# Patient Record
Sex: FEMALE | Race: AMERICAN INDIAN OR ALASKA NATIVE
[De-identification: names, ages, dates, MRNs, and addresses within clinical notes are randomized per-mention and may not be internally consistent; named-entity substitution may affect disease eponyms.]

---

## 2019-04-16 ENCOUNTER — HOSPITAL ENCOUNTER (INPATIENT)
Dept: HOSPITAL 5 - LD | Age: 24
LOS: 2 days | Discharge: HOME | End: 2019-04-18
Attending: OBSTETRICS & GYNECOLOGY | Admitting: OBSTETRICS & GYNECOLOGY
Payer: COMMERCIAL

## 2019-04-16 DIAGNOSIS — Z3A.40: ICD-10-CM

## 2019-04-16 DIAGNOSIS — O26.893: ICD-10-CM

## 2019-04-16 DIAGNOSIS — Z67.41: ICD-10-CM

## 2019-04-16 DIAGNOSIS — Z82.49: ICD-10-CM

## 2019-04-16 LAB
HCT VFR BLD CALC: 42.2 % (ref 30.3–42.9)
HGB BLD-MCNC: 14 GM/DL (ref 10.1–14.3)
MCHC RBC AUTO-ENTMCNC: 33 % (ref 30–34)
MCV RBC AUTO: 93 FL (ref 79–97)
PLATELET # BLD: 220 K/MM3 (ref 140–440)
RBC # BLD AUTO: 4.57 M/MM3 (ref 3.65–5.03)

## 2019-04-16 PROCEDURE — 86592 SYPHILIS TEST NON-TREP QUAL: CPT

## 2019-04-16 PROCEDURE — 86850 RBC ANTIBODY SCREEN: CPT

## 2019-04-16 PROCEDURE — 36415 COLL VENOUS BLD VENIPUNCTURE: CPT

## 2019-04-16 PROCEDURE — 10H07YZ INSERTION OF OTHER DEVICE INTO PRODUCTS OF CONCEPTION, VIA NATURAL OR ARTIFICIAL OPENING: ICD-10-PCS | Performed by: OBSTETRICS & GYNECOLOGY

## 2019-04-16 PROCEDURE — C1765 ADHESION BARRIER: HCPCS

## 2019-04-16 PROCEDURE — 85014 HEMATOCRIT: CPT

## 2019-04-16 PROCEDURE — 86901 BLOOD TYPING SEROLOGIC RH(D): CPT

## 2019-04-16 PROCEDURE — 85461 HEMOGLOBIN FETAL: CPT

## 2019-04-16 PROCEDURE — C9250 ARTISS FIBRIN SEALANT: HCPCS

## 2019-04-16 PROCEDURE — 85018 HEMOGLOBIN: CPT

## 2019-04-16 PROCEDURE — 85027 COMPLETE CBC AUTOMATED: CPT

## 2019-04-16 PROCEDURE — 86900 BLOOD TYPING SEROLOGIC ABO: CPT

## 2019-04-16 RX ADMIN — OXYTOCIN SCH MLS/HR: 10 INJECTION, SOLUTION INTRAMUSCULAR; INTRAVENOUS at 20:22

## 2019-04-16 RX ADMIN — SODIUM CHLORIDE, SODIUM LACTATE, POTASSIUM CHLORIDE, AND CALCIUM CHLORIDE SCH MLS/HR: .6; .31; .03; .02 INJECTION, SOLUTION INTRAVENOUS at 19:59

## 2019-04-16 RX ADMIN — OXYTOCIN SCH MLS/HR: 10 INJECTION, SOLUTION INTRAMUSCULAR; INTRAVENOUS at 21:40

## 2019-04-16 NOTE — PROGRESS NOTE
Assessment and Plan


 IVF bolus complete, waiting on anesthesia to place epidural. Difficult to trace

FHT continuously. ISE/IUPC placed without difficulty. No moulding or caput. 

cervix now feels swollen and tight around fetal head - unlike before where 

cervix was soft and stretchy. baby is OP.  FHT with early decels, good 

variability and accelerations.  Pelvis feels borderline adequate but difficult 

to fully asses while patient is in pain. Will re-evaluate after epidural and 

relaxation.








- Patient Problems


(1) Active labor at term


Current Visit: Yes   Status: Acute   





(2) Rh negative, maternal


Current Visit: Yes   Status: Acute   


Qualifiers: 


   Trimester: third trimester   Qualified Code(s): O26.893 - Other specified 

pregnancy related conditions, third trimester; Z67.91 - Unspecified blood type, 

Rh negative   





(3) Maternal varicella, non-immune


Current Visit: Yes   Status: Acute   





(4) GBS carrier


Current Visit: Yes   Status: Acute   





(5) 40 weeks gestation of pregnancy


Current Visit: Yes   Status: Acute   





Subjective





- Subjective


Date of service: 19


Principal diagnosis: IUP @ 40+6, laboring


Interval history: 


EDC Calculations by LMP: 04/10/2019








Past Pregnancy History 


 2


Para 0


   Term Births:      0


   Living Children:   0


   Para:      0


   Aborta:      1








Past Medical History:


   Abnormal pap (before age 23) per patient - waiting on records





Past Surgical History:


   Reviewed history from 2017 and no changes required:


      negative





Past Medical History 


Anesthesia Complications: negative


Anemia: negative


Autoimmune Disorder: negative


Bleeding Disorder: negative


Blood Transfusions: negative


Breast Disease: negative


Diabetes: negative


Heart Disease: negative


Hypertension: negative


Hepatitis/Liver Disease: negative


Kidney Disease/UTI: negative


Neurologic/Epilepsy/Migraines: negative


Phlebitis/Varicosities: negative


Psychiatric: negative


Pulmonary Disease/Asthma: negative


Thyroid Disease: negative


Hospitalizations: negative


Surgery (Non-gyn): negative


Abnormal PAP: positive, before age 23





Family Hx: mother  from unknown type of heart condition


Pt does not know any other family hx





Social Hx: Patient is single


bf for 4 years. 


debut age 19. only on esex partner


No ETOH/Drugs/Smoking


Works for gas station





Smoking History:


Patient has never smoked.








Infection History 


Hx of STD: none


HIV Risk Eval: low risk


Hepatitis B Risk Eval: low risk


Personal hx. of genital herpes: no


Partner hx. of genital herpes: no


Rash, Viral, or Febrile illness since last LMP? no


Varicella/Chicken Pox Status: Unknown


TB Risk: no





Genetic History 


 Congenital Heart Defect:


    Mom: no  Dad: no


Canavan Disease:


    Mom: no  Dad: no


Thalassemia


    Mom: no  Dad: no


Neural Tube Defect


    Mom: no  Dad: no


Down's Syndrome


    Mom: no  Dad: no


Familia-Sachs


    Mom: no  Dad: no


Sickle Cell Disease/Trait


    Mom: no  Dad: no


Hemophilia


    Mom: no  Dad: no


Muscular Dystrophy


    Mom: no  Dad: no


Cystic Fibrosis


    Mom: no  Dad: no


Nicki Chorea


    Mom: no  Dad: no


Mental Retardation


    Mom: no  Dad: no


Fragile X


    Mom: no  Dad: no


Other Genetic/Chromosomal Disorder


    Mom: no  Dad: no


Child w/other birth defect


    Mom: no  Dad: no





Enviromental Exposures 


Xray Exposure: no


Medication, drug, or alcohol use since LMP: no


Chemical/Other Exposure: no


Exposure to Cat Liter: no


Hx of Parvovirus (Fifth Disease): no


Occupational Exposure to Children: none


Active Medications (reviewed today):


FERROUS SULFATE TABLET (FERROUS SULFATE TABS) 





Current Allergies (reviewed today):


No known allergies





Patient reports: loss of fluid, contractions





Objective





- Vital Signs


Vital Signs: 


                               Vital Signs - 12hr











  19





  17:12 17:13 17:17


 


Pulse Rate 101 H 112 H 103 H


 


Blood Pressure  123/59 


 


O2 Sat by Pulse 99  100





Oximetry   














  19





  17:22 17:27 17:32


 


Pulse Rate 94 H 92 H 104 H


 


Blood Pressure   


 


O2 Sat by Pulse 100 99 100





Oximetry   














  19





  17:37 17:42 17:47


 


Pulse Rate 118 H 115 H 104 H


 


Blood Pressure   


 


O2 Sat by Pulse 100 100 100





Oximetry   














  19





  17:52 17:57 18:02


 


Pulse Rate 94 H 103 H 99 H


 


Blood Pressure   


 


O2 Sat by Pulse 100 100 100





Oximetry   














  19





  18:07 18:12 18:16


 


Pulse Rate 96 H 95 H 82


 


Blood Pressure   


 


O2 Sat by Pulse 100 100 100





Oximetry   














  19





  18:18 18:22 18:26


 


Pulse Rate 110 H 104 H 104 H


 


Blood Pressure   


 


O2 Sat by Pulse 88 100 70 L





Oximetry   














  19





  18:27 18:32 18:33


 


Pulse Rate 98 H 85 97 H


 


Blood Pressure   


 


O2 Sat by Pulse 100 100 92





Oximetry   














  19





  18:37 18:38 18:42


 


Pulse Rate 94 H 112 H 112 H


 


Blood Pressure   


 


O2 Sat by Pulse 100 90 96





Oximetry   














  19





  18:45 18:47 18:52


 


Pulse Rate 109 H 94 H 113 H


 


Blood Pressure   


 


O2 Sat by Pulse 82 L 100 99





Oximetry   














  19





  18:56 18:57 19:01


 


Pulse Rate 101 H 99 H 101 H


 


Blood Pressure   


 


O2 Sat by Pulse 98 51 L 98





Oximetry   














  19





  19:02


 


Pulse Rate 103 H


 


Blood Pressure 


 


O2 Sat by Pulse 65 L





Oximetry 














- Exam


Breasts: normal


Cardiovascular: Regular rate


Lungs: Clear to auscultation, Normal air movement


Abdomen: Present: normal appearance, soft


Vulva: both: normal


Uterus: Present: normal


FHR: category 2


Uterine Contraction Monitor Mode: Internal


Cervical Dilatation: 8 (cervix swollen)


Cervical Effacement Percentage: 80


Fetal station: 0


Uterine Contraction Frequency (min): 3-4


Uterine Contraction Duration: 60


Uterine Contraction Pattern: Regular


Uterine Tone Measurement Phase: Contraction


Uterine Contraction Intensity: Moderate


Extremities: normal


Deep Tendon Reflex Grade: Normal +2





- Labs


Labs: 


                                  Abnormal Labs











  19





  17:05


 


WBC  18.7 H








                         Laboratory Results - last 24 hr











  19





  17:05


 


WBC  18.7 H


 


RBC  4.57


 


Hgb  14.0


 


Hct  42.2


 


MCV  93


 


MCH  31


 


MCHC  33


 


RDW  14.6


 


Plt Count  220

## 2019-04-16 NOTE — HISTORY AND PHYSICAL REPORT
History of Present Illness


Date of examination: 19


Date of admission: 


19 16:31





Chief complaint: 


 sent from office, regular ctx,  changed from 4-5.5cms





History of present illness: 


EDC Calculations by LMP: 04/10/2019








Past Pregnancy History 


 2


Para 0


   Term Births:      0


   Living Children:   0


   Para:      0


   Aborta:      1








Past Medical History:


   Abnormal pap (before age 23) per patient - waiting on records





Past Surgical History:


   Reviewed history from 2017 and no changes required:


      negative





Past Medical History 


Anesthesia Complications: negative


Anemia: negative


Autoimmune Disorder: negative


Bleeding Disorder: negative


Blood Transfusions: negative


Breast Disease: negative


Diabetes: negative


Heart Disease: negative


Hypertension: negative


Hepatitis/Liver Disease: negative


Kidney Disease/UTI: negative


Neurologic/Epilepsy/Migraines: negative


Phlebitis/Varicosities: negative


Psychiatric: negative


Pulmonary Disease/Asthma: negative


Thyroid Disease: negative


Hospitalizations: negative


Surgery (Non-gyn): negative


Abnormal PAP: positive, before age 23





Family Hx: mother  from unknown type of heart condition


Pt does not know any other family hx





Social Hx: Patient is single


bf for 4 years. 


debut age 19. only on esex partner


No ETOH/Drugs/Smoking


Works for gas station





Smoking History:


Patient has never smoked.








Infection History 


Hx of STD: none


HIV Risk Eval: low risk


Hepatitis B Risk Eval: low risk


Personal hx. of genital herpes: no


Partner hx. of genital herpes: no


Rash, Viral, or Febrile illness since last LMP? no


Varicella/Chicken Pox Status: Unknown


TB Risk: no





Genetic History 


 Congenital Heart Defect:


    Mom: no  Dad: no


Canavan Disease:


    Mom: no  Dad: no


Thalassemia


    Mom: no  Dad: no


Neural Tube Defect


    Mom: no  Dad: no


Down's Syndrome


    Mom: no  Dad: no


Familia-Sachs


    Mom: no  Dad: no


Sickle Cell Disease/Trait


    Mom: no  Dad: no


Hemophilia


    Mom: no  Dad: no


Muscular Dystrophy


    Mom: no  Dad: no


Cystic Fibrosis


    Mom: no  Dad: no


Bacon Chorea


    Mom: no  Dad: no


Mental Retardation


    Mom: no  Dad: no


Fragile X


    Mom: no  Dad: no


Other Genetic/Chromosomal Disorder


    Mom: no  Dad: no


Child w/other birth defect


    Mom: no  Dad: no





Enviromental Exposures 


Xray Exposure: no


Medication, drug, or alcohol use since LMP: no


Chemical/Other Exposure: no


Exposure to Cat Liter: no


Hx of Parvovirus (Fifth Disease): no


Occupational Exposure to Children: none


Active Medications (reviewed today):


FERROUS SULFATE TABLET (FERROUS SULFATE TABS) 





Current Allergies (reviewed today):


No known allergies








Past History


Past Medical History: other (see HPI)


Past Surgical History: other (see HPI)


GYN History: other (see HPI)


Family/Genetic History: other (see HPI)





- Obstetrical History


Expected Date of Delivery: 04/10/19


Actual Gestation: 40 Week(s) 6 Day(s) 


: 2


Para: 0


Hx # Term Pregnancies: 0


Number of  Pregnancies: 0


Spontaneous Abortions: 0


Induced : 1


Number of Living Children: 0





Medications and Allergies


                                    Allergies











Allergy/AdvReac Type Severity Reaction Status Date / Time


 


No Known Allergies Allergy   Verified 19 16:56














Review of Systems


All systems: negative





- Physical Exam


Breasts: Positive: normal


Cardiovascular: Regular rate


Lungs: Positive: Clear to auscultation, Normal air movement


Abdomen: Positive: normal appearance, soft


Genitourinary (Female): Positive: normal external genitalia, normal perenium


Vulva: both: normal


Vagina: Positive: normal moisture





- Obstetrical


FHR: category 2


Uterine Contraction Monitor Mode: External


Cervical Dilatation: 8 (AROM clear)


Cervical Effacement Percentage: 100


Fetal station: 0


Uterine Contraction Pattern: Irregular


Uterine Tone Measurement Phase: Contraction


Uterine Contraction Intensity: Strong/Firm





Results


Result Diagrams: 


                                 19 17:05





All other labs normal.








Assessment and Plan


 Patient admitted in labor, orders in EMR. IVF open for bolus for epidural. 

antibiotics started. AROM during SVE.








- Patient Problems


(1) Active labor at term


Current Visit: Yes   Status: Acute   


Plan to address problem: 


Admission orders in EMR. 


Anticipate 


EFW today in office 6#15oz, vertex








(2) Rh negative, maternal


Current Visit: Yes   Status: Acute   


Qualifiers: 


   Trimester: third trimester   Qualified Code(s): O26.893 - Other specified 

pregnancy related conditions, third trimester; Z67.91 - Unspecified blood type, 

Rh negative   


Plan to address problem: 


Rhogam work up postpartum








(3) Maternal varicella, non-immune


Current Visit: Yes   Status: Acute   


Plan to address problem: 


Vaccine postpartum








(4) GBS carrier


Current Visit: Yes   Status: Acute   


Plan to address problem: 


Ampicillin q4h until delivery








(5) 40 weeks gestation of pregnancy


Current Visit: Yes   Status: Acute

## 2019-04-16 NOTE — PROGRESS NOTE
Assessment and Plan


  Patient comfortable with epidural, pt has been turned from [facility] to [facility]. Cervix 

is now 7/70/-1 (0 station with ctx). Unable to determine adequacy of pelvis.Dr. Menon made aware of current assessment. Will continue to increase pitocin for 

augmentation. 








- Patient Problems


(1) Active labor at term


Current Visit: Yes   Status: Acute   





(2) Rh negative, maternal


Current Visit: Yes   Status: Acute   


Qualifiers: 


   Trimester: third trimester   Qualified Code(s): O26.893 - Other specified 

pregnancy related conditions, third trimester; Z67.91 - Unspecified blood type, 

Rh negative   





(3) Maternal varicella, non-immune


Current Visit: Yes   Status: Acute   





(4) GBS carrier


Current Visit: Yes   Status: Acute   





(5) 40 weeks gestation of pregnancy


Current Visit: Yes   Status: Acute   





Subjective





- Subjective


Date of service: 19


Principal diagnosis: IUP @ 40+6, laboring


Interval history: 


EDC Calculations by LMP: 04/10/2019








Past Pregnancy History 


 2


Para 0


   Term Births:      0


   Living Children:   0


   Para:      0


   Aborta:      1








Past Medical History:


   Abnormal pap (before age 23) per patient - waiting on records





Past Surgical History:


   Reviewed history from 2017 and no changes required:


      negative





Past Medical History 


Anesthesia Complications: negative


Anemia: negative


Autoimmune Disorder: negative


Bleeding Disorder: negative


Blood Transfusions: negative


Breast Disease: negative


Diabetes: negative


Heart Disease: negative


Hypertension: negative


Hepatitis/Liver Disease: negative


Kidney Disease/UTI: negative


Neurologic/Epilepsy/Migraines: negative


Phlebitis/Varicosities: negative


Psychiatric: negative


Pulmonary Disease/Asthma: negative


Thyroid Disease: negative


Hospitalizations: negative


Surgery (Non-gyn): negative


Abnormal PAP: positive, before age 23





Family Hx: mother  from unknown type of heart condition


Pt does not know any other family hx





Social Hx: Patient is single


bf for 4 years. 


debut age 19. only on esex partner


No ETOH/Drugs/Smoking


Works for gas station





Smoking History:


Patient has never smoked.








Infection History 


Hx of STD: none


HIV Risk Eval: low risk


Hepatitis B Risk Eval: low risk


Personal hx. of genital herpes: no


Partner hx. of genital herpes: no


Rash, Viral, or Febrile illness since last LMP? no


Varicella/Chicken Pox Status: Unknown


TB Risk: no





Genetic History 


 Congenital Heart Defect:


    Mom: no  Dad: no


Canavan Disease:


    Mom: no  Dad: no


Thalassemia


    Mom: no  Dad: no


Neural Tube Defect


    Mom: no  Dad: no


Down's Syndrome


    Mom: no  Dad: no


Familia-Sachs


    Mom: no  Dad: no


Sickle Cell Disease/Trait


    Mom: no  Dad: no


Hemophilia


    Mom: no  Dad: no


Muscular Dystrophy


    Mom: no  Dad: no


Cystic Fibrosis


    Mom: no  Dad: no


Frederick Chorea


    Mom: no  Dad: no


Mental Retardation


    Mom: no  Dad: no


Fragile X


    Mom: no  Dad: no


Other Genetic/Chromosomal Disorder


    Mom: no  Dad: no


Child w/other birth defect


    Mom: no  Dad: no





Enviromental Exposures 


Xray Exposure: no


Medication, drug, or alcohol use since LMP: no


Chemical/Other Exposure: no


Exposure to Cat Liter: no


Hx of Parvovirus (Fifth Disease): no


Occupational Exposure to Children: none


Active Medications (reviewed today):


FERROUS SULFATE TABLET (FERROUS SULFATE TABS) 





Current Allergies (reviewed today):


No known allergies





Patient reports: new complaints (comfortable with epidural)





Objective





- Vital Signs


Vital Signs: 


                               Vital Signs - 12hr











  19





  17:12 17:13 17:17


 


Temperature   


 


Pulse Rate 101 H 112 H 103 H


 


Respiratory   





Rate   


 


Blood Pressure  123/59 


 


Blood Pressure   





[Left]   


 


O2 Sat by Pulse 99  100





Oximetry   














  19





  17:22 17:27 17:32


 


Temperature   


 


Pulse Rate 94 H 92 H 104 H


 


Respiratory   





Rate   


 


Blood Pressure   


 


Blood Pressure   





[Left]   


 


O2 Sat by Pulse 100 99 100





Oximetry   














  19





  17:37 17:42 17:47


 


Temperature   


 


Pulse Rate 118 H 115 H 104 H


 


Respiratory   





Rate   


 


Blood Pressure   


 


Blood Pressure   





[Left]   


 


O2 Sat by Pulse 100 100 100





Oximetry   














  19





  17:52 17:57 18:02


 


Temperature   


 


Pulse Rate 94 H 103 H 99 H


 


Respiratory   





Rate   


 


Blood Pressure   


 


Blood Pressure   





[Left]   


 


O2 Sat by Pulse 100 100 100





Oximetry   














  19





  18:07 18:12 18:16


 


Temperature   


 


Pulse Rate 96 H 95 H 82


 


Respiratory   





Rate   


 


Blood Pressure   


 


Blood Pressure   





[Left]   


 


O2 Sat by Pulse 100 100 100





Oximetry   














  19





  18:18 18:22 18:26


 


Temperature   


 


Pulse Rate 110 H 104 H 104 H


 


Respiratory   





Rate   


 


Blood Pressure   


 


Blood Pressure   





[Left]   


 


O2 Sat by Pulse 88 100 70 L





Oximetry   














  19





  18:27 18:32 18:33


 


Temperature   


 


Pulse Rate 98 H 85 97 H


 


Respiratory   





Rate   


 


Blood Pressure   


 


Blood Pressure   





[Left]   


 


O2 Sat by Pulse 100 100 92





Oximetry   














  19





  18:37 18:38 18:42


 


Temperature   


 


Pulse Rate 94 H 112 H 112 H


 


Respiratory   





Rate   


 


Blood Pressure   


 


Blood Pressure   





[Left]   


 


O2 Sat by Pulse 100 90 96





Oximetry   














  19





  18:45 18:47 18:52


 


Temperature   


 


Pulse Rate 109 H 94 H 113 H


 


Respiratory   





Rate   


 


Blood Pressure   


 


Blood Pressure   





[Left]   


 


O2 Sat by Pulse 82 L 100 99





Oximetry   














  19





  18:56 18:57 19:01


 


Temperature   


 


Pulse Rate 101 H 99 H 101 H


 


Respiratory   





Rate   


 


Blood Pressure   


 


Blood Pressure   





[Left]   


 


O2 Sat by Pulse 98 51 L 98





Oximetry   














  19





  19:02 19:07 19:12


 


Temperature   


 


Pulse Rate 103 H 100 H 100 H


 


Respiratory   





Rate   


 


Blood Pressure   


 


Blood Pressure   





[Left]   


 


O2 Sat by Pulse 65 L 100 100





Oximetry   














  19





  19:13 19:18 19:19


 


Temperature   


 


Pulse Rate 108 H 102 H 106 H


 


Respiratory   





Rate   


 


Blood Pressure  130/61 


 


Blood Pressure   





[Left]   


 


O2 Sat by Pulse 93 100 91





Oximetry   














  19





  19:22 19:23 19:25


 


Temperature   


 


Pulse Rate 97 H 96 H 104 H


 


Respiratory   





Rate   


 


Blood Pressure 116/54 118/58 119/56


 


Blood Pressure   





[Left]   


 


O2 Sat by Pulse  100 





Oximetry   














  19





  19:27 19:28 19:29


 


Temperature   


 


Pulse Rate 107 H 98 H 100 H


 


Respiratory   





Rate   


 


Blood Pressure 119/57  123/59


 


Blood Pressure   





[Left]   


 


O2 Sat by Pulse  99 





Oximetry   














  19





  19:31 19:32 19:34


 


Temperature   


 


Pulse Rate 48 L 104 H 95 H


 


Respiratory   





Rate   


 


Blood Pressure 101/60  109/55


 


Blood Pressure   





[Left]   


 


O2 Sat by Pulse  76 L 





Oximetry   














  19





  19:35 19:37 19:38


 


Temperature   


 


Pulse Rate 96 H 233 H 86


 


Respiratory   





Rate   


 


Blood Pressure 115/56  115/58


 


Blood Pressure   





[Left]   


 


O2 Sat by Pulse  71 L 





Oximetry   














  19





  19:39 19:42 19:43


 


Temperature   


 


Pulse Rate 96 H 98 H 104 H


 


Respiratory   





Rate   


 


Blood Pressure 115/57 111/56 


 


Blood Pressure   





[Left]   


 


O2 Sat by Pulse  85 93





Oximetry   














  19





  19:48 19:49 19:52


 


Temperature   


 


Pulse Rate 102 H 107 H 101 H


 


Respiratory   





Rate   


 


Blood Pressure   


 


Blood Pressure   





[Left]   


 


O2 Sat by Pulse 100 77 L 73 L





Oximetry   














  19





  19:54 19:57 20:01


 


Temperature   


 


Pulse Rate 105 H 95 H 53 L


 


Respiratory   





Rate   


 


Blood Pressure   


 


Blood Pressure   





[Left]   


 


O2 Sat by Pulse 81 L 90 83 L





Oximetry   














  19





  20:02 20:07 20:12


 


Temperature  97.3 F L 


 


Pulse Rate 102 H 100 H 80


 


Respiratory  18 





Rate   


 


Blood Pressure   


 


Blood Pressure  111/56 





[Left]   


 


O2 Sat by Pulse 93 100 65 L





Oximetry   














  19





  20:15 20:17 20:22


 


Temperature   


 


Pulse Rate 104 H 86 99 H


 


Respiratory   





Rate   


 


Blood Pressure   


 


Blood Pressure   





[Left]   


 


O2 Sat by Pulse 90 76 L 87





Oximetry   














  19





  20:23 20:27 20:28


 


Temperature   


 


Pulse Rate 98 H 94 H 90


 


Respiratory   





Rate   


 


Blood Pressure   


 


Blood Pressure   





[Left]   


 


O2 Sat by Pulse 86 98 86





Oximetry   














  19





  20:32 20:37 20:39


 


Temperature   


 


Pulse Rate 98 H 104 H 95 H


 


Respiratory   





Rate   


 


Blood Pressure   94/42


 


Blood Pressure   





[Left]   


 


O2 Sat by Pulse 89 100 86





Oximetry   














  19





  20:42 20:47 20:52


 


Temperature   


 


Pulse Rate 99 H 104 H 108 H


 


Respiratory   





Rate   


 


Blood Pressure   


 


Blood Pressure   





[Left]   


 


O2 Sat by Pulse 100 99 100





Oximetry   














  19





  20:54 20:55 20:57


 


Temperature   


 


Pulse Rate 93 H 100 H 110 H


 


Respiratory   





Rate   


 


Blood Pressure  155/125 


 


Blood Pressure   





[Left]   


 


O2 Sat by Pulse 86  99





Oximetry   














  19





  21:00 21:02 21:03


 


Temperature   


 


Pulse Rate 96 H 91 H 94 H


 


Respiratory   





Rate   


 


Blood Pressure   89/53


 


Blood Pressure   





[Left]   


 


O2 Sat by Pulse 84 100 





Oximetry   














  19





  21:07 21:08 21:12


 


Temperature   


 


Pulse Rate 80 99 H 94 H


 


Respiratory   





Rate   


 


Blood Pressure  181/103 


 


Blood Pressure   





[Left]   


 


O2 Sat by Pulse 75 L  100





Oximetry   














  19





  21:13 21:17 21:19


 


Temperature   


 


Pulse Rate 95 H 95 H 103 H


 


Respiratory   





Rate   


 


Blood Pressure 140/57  84/51


 


Blood Pressure   





[Left]   


 


O2 Sat by Pulse  100 





Oximetry   














  19





  21:22 21:27


 


Temperature  


 


Pulse Rate 95 H 95 H


 


Respiratory  





Rate  


 


Blood Pressure 86/59 110/56


 


Blood Pressure  





[Left]  


 


O2 Sat by Pulse 100 100





Oximetry  














- Exam


Breasts: normal


Cardiovascular: Regular rate


Abdomen: Present: normal appearance, soft


Vulva: both: normal


Uterus: Present: normal


FHR: category 1


Uterine Contraction Monitor Mode: Internal


Cervical Dilatation: 7


Cervical Effacement Percentage: 70


Fetal station: 0


Uterine Contraction Frequency (min): 2-3


Uterine Contraction Duration: 60


Uterine Contraction Pattern: Regular


Uterine Tone Measurement Phase: Contraction


Uterine Contraction Intensity: Moderate


Extremities: normal


Deep Tendon Reflex Grade: Normal +2





- Labs


Labs: 


                                  Abnormal Labs











  19





  17:05


 


WBC  18.7 H








                         Laboratory Results - last 24 hr











  19





  17:05 17:05


 


WBC  18.7 H 


 


RBC  4.57 


 


Hgb  14.0 


 


Hct  42.2 


 


MCV  93 


 


MCH  31 


 


MCHC  33 


 


RDW  14.6 


 


Plt Count  220 


 


Blood Type   O NEGATIVE


 


Antibody Screen   Negative

## 2019-04-16 NOTE — PROGRESS NOTE
Assessment and Plan





- Patient Problems


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


Current Visit: Yes   Status: Acute   


Plan to address problem: 


No cervical change, actually swelling of cervix now. Unable to adequately assess

pelvis d/t fetal position and active contractions. Options reviewed: continue 

TIFFANY vs C/S. She was informed that with continued TIFFANY she may become infected, 

fetal status deterioration, shoulder dystocia with possible temporary or 

permanent injury to extremities, fetal brain damage or fetal demise. Risks 

associated with c/s discussed with her , including be not limited to, 

bleeding that may require transfusion and complications, infection, injury to 

bowel and/or bladder that could be life threatening or fatal. She was also 

informed she may have to have a  delivery for all subsequent pregnan

cies. Questions were encouraged and answered,  consents were reviewed and 

signed, she and her   voiced understanding and desires to proceed with 

 delivery. 








(2) 40 weeks gestation of pregnancy


Current Visit: Yes   Status: Acute   





(3) GBS carrier


Current Visit: Yes   Status: Acute   





(4) Maternal varicella, non-immune


Current Visit: Yes   Status: Acute   





(5) Rh negative, maternal


Current Visit: Yes   Status: Acute   


Qualifiers: 


   Trimester: third trimester   Qualified Code(s): O26.893 - Other specified 

pregnancy related conditions, third trimester; Z67.91 - Unspecified blood type, 

Rh negative   





Subjective





- Subjective


Date of service: 19


Principal diagnosis: IUP @ 40+6, laboring


Patient reports: new complaints (comfortable with epidural)





Objective





- Vital Signs


Vital Signs: 


                               Vital Signs - 12hr











  19





  17:12 17:13 17:17


 


Temperature   


 


Pulse Rate 101 H 112 H 103 H


 


Respiratory   





Rate   


 


Blood Pressure  123/59 


 


Blood Pressure   





[Left]   


 


O2 Sat by Pulse 99  100





Oximetry   














  19





  17:22 17:27 17:32


 


Temperature   


 


Pulse Rate 94 H 92 H 104 H


 


Respiratory   





Rate   


 


Blood Pressure   


 


Blood Pressure   





[Left]   


 


O2 Sat by Pulse 100 99 100





Oximetry   














  19





  17:37 17:42 17:47


 


Temperature   


 


Pulse Rate 118 H 115 H 104 H


 


Respiratory   





Rate   


 


Blood Pressure   


 


Blood Pressure   





[Left]   


 


O2 Sat by Pulse 100 100 100





Oximetry   














  19





  17:52 17:57 18:02


 


Temperature   


 


Pulse Rate 94 H 103 H 99 H


 


Respiratory   





Rate   


 


Blood Pressure   


 


Blood Pressure   





[Left]   


 


O2 Sat by Pulse 100 100 100





Oximetry   














  19





  18:07 18:12 18:16


 


Temperature   


 


Pulse Rate 96 H 95 H 82


 


Respiratory   





Rate   


 


Blood Pressure   


 


Blood Pressure   





[Left]   


 


O2 Sat by Pulse 100 100 100





Oximetry   














  19





  18:18 18:22 18:26


 


Temperature   


 


Pulse Rate 110 H 104 H 104 H


 


Respiratory   





Rate   


 


Blood Pressure   


 


Blood Pressure   





[Left]   


 


O2 Sat by Pulse 88 100 70 L





Oximetry   














  19





  18:27 18:32 18:33


 


Temperature   


 


Pulse Rate 98 H 85 97 H


 


Respiratory   





Rate   


 


Blood Pressure   


 


Blood Pressure   





[Left]   


 


O2 Sat by Pulse 100 100 92





Oximetry   














  19





  18:37 18:38 18:42


 


Temperature   


 


Pulse Rate 94 H 112 H 112 H


 


Respiratory   





Rate   


 


Blood Pressure   


 


Blood Pressure   





[Left]   


 


O2 Sat by Pulse 100 90 96





Oximetry   














  19





  18:45 18:47 18:52


 


Temperature   


 


Pulse Rate 109 H 94 H 113 H


 


Respiratory   





Rate   


 


Blood Pressure   


 


Blood Pressure   





[Left]   


 


O2 Sat by Pulse 82 L 100 99





Oximetry   














  19





  18:56 18:57 19:01


 


Temperature   


 


Pulse Rate 101 H 99 H 101 H


 


Respiratory   





Rate   


 


Blood Pressure   


 


Blood Pressure   





[Left]   


 


O2 Sat by Pulse 98 51 L 98





Oximetry   














  19





  19:02 19:07 19:12


 


Temperature   


 


Pulse Rate 103 H 100 H 100 H


 


Respiratory   





Rate   


 


Blood Pressure   


 


Blood Pressure   





[Left]   


 


O2 Sat by Pulse 65 L 100 100





Oximetry   














  19





  19:13 19:18 19:19


 


Temperature   


 


Pulse Rate 108 H 102 H 106 H


 


Respiratory   





Rate   


 


Blood Pressure  130/61 


 


Blood Pressure   





[Left]   


 


O2 Sat by Pulse 93 100 91





Oximetry   














  19





  19:22 19:23 19:25


 


Temperature   


 


Pulse Rate 97 H 96 H 104 H


 


Respiratory   





Rate   


 


Blood Pressure 116/54 118/58 119/56


 


Blood Pressure   





[Left]   


 


O2 Sat by Pulse  100 





Oximetry   














  19





  19:27 19:28 19:29


 


Temperature   


 


Pulse Rate 107 H 98 H 100 H


 


Respiratory   





Rate   


 


Blood Pressure 119/57  123/59


 


Blood Pressure   





[Left]   


 


O2 Sat by Pulse  99 





Oximetry   














  19





  19:31 19:32 19:34


 


Temperature   


 


Pulse Rate 48 L 104 H 95 H


 


Respiratory   





Rate   


 


Blood Pressure 101/60  109/55


 


Blood Pressure   





[Left]   


 


O2 Sat by Pulse  76 L 





Oximetry   














  19





  19:35 19:37 19:38


 


Temperature   


 


Pulse Rate 96 H 233 H 86


 


Respiratory   





Rate   


 


Blood Pressure 115/56  115/58


 


Blood Pressure   





[Left]   


 


O2 Sat by Pulse  71 L 





Oximetry   














  19





  19:39 19:42 19:43


 


Temperature   


 


Pulse Rate 96 H 98 H 104 H


 


Respiratory   





Rate   


 


Blood Pressure 115/57 111/56 


 


Blood Pressure   





[Left]   


 


O2 Sat by Pulse  85 93





Oximetry   














  19





  19:48 19:49 19:52


 


Temperature   


 


Pulse Rate 102 H 107 H 101 H


 


Respiratory   





Rate   


 


Blood Pressure   


 


Blood Pressure   





[Left]   


 


O2 Sat by Pulse 100 77 L 73 L





Oximetry   














  19





  19:54 19:57 20:01


 


Temperature   


 


Pulse Rate 105 H 95 H 53 L


 


Respiratory   





Rate   


 


Blood Pressure   


 


Blood Pressure   





[Left]   


 


O2 Sat by Pulse 81 L 90 83 L





Oximetry   














  19





  20:02 20:07 20:12


 


Temperature  97.3 F L 


 


Pulse Rate 102 H 100 H 80


 


Respiratory  18 





Rate   


 


Blood Pressure   


 


Blood Pressure  111/56 





[Left]   


 


O2 Sat by Pulse 93 100 65 L





Oximetry   














  19





  20:15 20:17 20:22


 


Temperature   


 


Pulse Rate 104 H 86 99 H


 


Respiratory   





Rate   


 


Blood Pressure   


 


Blood Pressure   





[Left]   


 


O2 Sat by Pulse 90 76 L 87





Oximetry   














  19





  20:23 20:27 20:28


 


Temperature   


 


Pulse Rate 98 H 94 H 90


 


Respiratory   





Rate   


 


Blood Pressure   


 


Blood Pressure   





[Left]   


 


O2 Sat by Pulse 86 98 86





Oximetry   














  19





  20:32 20:37 20:39


 


Temperature   


 


Pulse Rate 98 H 104 H 95 H


 


Respiratory   





Rate   


 


Blood Pressure   94/42


 


Blood Pressure   





[Left]   


 


O2 Sat by Pulse 89 100 86





Oximetry   














  19





  20:42 20:47 20:52


 


Temperature   


 


Pulse Rate 99 H 104 H 108 H


 


Respiratory   





Rate   


 


Blood Pressure   


 


Blood Pressure   





[Left]   


 


O2 Sat by Pulse 100 99 100





Oximetry   














  19





  20:54 20:55 20:57


 


Temperature   


 


Pulse Rate 93 H 100 H 110 H


 


Respiratory   





Rate   


 


Blood Pressure  155/125 


 


Blood Pressure   





[Left]   


 


O2 Sat by Pulse 86  99





Oximetry   














  19





  21:00 21:02 21:03


 


Temperature   


 


Pulse Rate 96 H 91 H 94 H


 


Respiratory   





Rate   


 


Blood Pressure   89/53


 


Blood Pressure   





[Left]   


 


O2 Sat by Pulse 84 100 





Oximetry   














  19





  21:07 21:08 21:12


 


Temperature   


 


Pulse Rate 80 99 H 94 H


 


Respiratory   





Rate   


 


Blood Pressure  181/103 


 


Blood Pressure   





[Left]   


 


O2 Sat by Pulse 75 L  100





Oximetry   














  19





  21:13 21:17 21:19


 


Temperature   


 


Pulse Rate 95 H 95 H 103 H


 


Respiratory   





Rate   


 


Blood Pressure 140/57  84/51


 


Blood Pressure   





[Left]   


 


O2 Sat by Pulse  100 





Oximetry   














  19





  21:22 21:27 21:32


 


Temperature   


 


Pulse Rate 95 H 95 H 94 H


 


Respiratory   





Rate   


 


Blood Pressure 86/59 110/56 


 


Blood Pressure   





[Left]   


 


O2 Sat by Pulse 100 100 100





Oximetry   














  19





  21:37 21:42 21:47


 


Temperature   


 


Pulse Rate 96 H 86 99 H


 


Respiratory   





Rate   


 


Blood Pressure   


 


Blood Pressure   





[Left]   


 


O2 Sat by Pulse 100 100 100





Oximetry   














  19





  21:52 21:57 22:00


 


Temperature   


 


Pulse Rate 92 H 87 90


 


Respiratory   





Rate   


 


Blood Pressure   93/67


 


Blood Pressure   





[Left]   


 


O2 Sat by Pulse 100 100 





Oximetry   














  19





  22:02 22:07 22:08


 


Temperature   


 


Pulse Rate 93 H 98 H 100 H


 


Respiratory   





Rate   


 


Blood Pressure   


 


Blood Pressure   





[Left]   


 


O2 Sat by Pulse 100 97 91





Oximetry   














  19





  22:12 22:17 22:22


 


Temperature   


 


Pulse Rate 97 H 93 H 113 H


 


Respiratory   





Rate   


 


Blood Pressure   


 


Blood Pressure   





[Left]   


 


O2 Sat by Pulse 100 100 100





Oximetry   














  19





  22:27 22:31


 


Temperature  


 


Pulse Rate 106 H 99 H


 


Respiratory  





Rate  


 


Blood Pressure  115/56


 


Blood Pressure  





[Left]  


 


O2 Sat by Pulse 100 





Oximetry  














- Exam


FHR: category 1, category 2


Uterine Contraction Monitor Mode: Internal


Cervical Dilatation: 6


Cervical Effacement Percentage: 50


Fetal station: -1


Uterine Contraction Pattern: Regular





- Labs


Labs: 


                                  Abnormal Labs











  19





  17:05


 


WBC  18.7 H








                         Laboratory Results - last 24 hr











  19





  17:05 17:05


 


WBC  18.7 H 


 


RBC  4.57 


 


Hgb  14.0 


 


Hct  42.2 


 


MCV  93 


 


MCH  31 


 


MCHC  33 


 


RDW  14.6 


 


Plt Count  220 


 


Blood Type   O NEGATIVE


 


Antibody Screen   Negative

## 2019-04-17 LAB
HCT VFR BLD CALC: 38.4 % (ref 30.3–42.9)
HGB BLD-MCNC: 12.8 GM/DL (ref 10.1–14.3)

## 2019-04-17 PROCEDURE — 3E0334Z INTRODUCTION OF SERUM, TOXOID AND VACCINE INTO PERIPHERAL VEIN, PERCUTANEOUS APPROACH: ICD-10-PCS | Performed by: OBSTETRICS & GYNECOLOGY

## 2019-04-17 RX ADMIN — CEFAZOLIN SCH MLS/HR: 330 INJECTION, POWDER, FOR SOLUTION INTRAMUSCULAR; INTRAVENOUS at 11:10

## 2019-04-17 RX ADMIN — IBUPROFEN PRN MG: 800 TABLET, FILM COATED ORAL at 20:21

## 2019-04-17 RX ADMIN — CEFAZOLIN SCH MLS/HR: 330 INJECTION, POWDER, FOR SOLUTION INTRAMUSCULAR; INTRAVENOUS at 18:10

## 2019-04-17 RX ADMIN — HYDROCODONE BITARTRATE AND ACETAMINOPHEN PRN EACH: 5; 325 TABLET ORAL at 16:44

## 2019-04-17 RX ADMIN — HYDROCODONE BITARTRATE AND ACETAMINOPHEN PRN EACH: 5; 325 TABLET ORAL at 23:34

## 2019-04-17 RX ADMIN — SODIUM CHLORIDE, SODIUM LACTATE, POTASSIUM CHLORIDE, AND CALCIUM CHLORIDE SCH MLS/HR: .6; .31; .03; .02 INJECTION, SOLUTION INTRAVENOUS at 02:43

## 2019-04-17 NOTE — PROGRESS NOTE
Assessment and Plan


POD1 Patient reports feeling well, moderate pain to incision area on palpation 

of uterus. Patient requesting dose of norco at this time, RN to administer. 

Incision is dressed, clean dry intact. Vaginal bleeding is scant. Fundus firm, 

ML, U/2. Padron catheter draining well, to be discontinued around 11 am. Patient 

encouraged to ambulate today, with assistance initially until stable alone. 

Continue IS. May shower this afternoon and remove dressing in shower. VSS, 

afebrile at this time, will continue ancef as ordered and monitor. Pt reports 

bottle feeding at this time, will attempt breast feeding again today. Encouraged

increase in water intake. Awaiting post op labs. Continue post op pathway. 








Subjective





- Subjective


Date of service: 19


Principal diagnosis: POD1 s/p primary c/s failure to dilate


Patient reports: appetite normal, no voiding normally (padron in place), no pain 

well controlled (requesting medications at this time)


[place]: doing well





Objective





- Vital Signs


Latest vital signs: 


                                   Vital Signs











  Temp Pulse Resp BP BP Pulse Ox


 


 19 07:21  99.4 F  100 H  18   107/50 


 


 19 03:16  98.7 F  82  16  119/57   100


 


 19 02:06  99.5 F  86  18  112/61   100


 


 19 01:35  98.2 F  93 H  14  118/57   100


 


 19 01:25   100 H  19  115/64   100


 


 19 01:20    21   


 


 19 01:10   93 H  17  115/57   100


 


 19 00:55   93 H  21  115/58   100


 


 19 00:40   89  21  113/52   100


 


 19 00:25   83  11 L  101/47   100


 


 19 00:15   95 H  21  115/61   100


 


 19 00:12  100.7 F H  92 H  22  106/52   100


 


 19 22:38   111 H   100/58  


 


 19 22:32   108 H     100


 


 19 22:31   99 H   115/56  


 


 19 22:27   106 H     100


 


 19 22:22   113 H     100


 


 19 22:17   93 H     100


 


 19 22:12   97 H     100


 


 19 22:08   100 H     91


 


 19 22:07   98 H     97


 


 19 22:02   93 H     100


 


 19 22:00   90   93/67  


 


 19 21:57   87     100


 


 19 21:52   92 H     100


 


 19 21:47   99 H     100


 


 19 21:42   86     100


 


 19 21:37   96 H     100


 


 19 21:32   94 H     100


 


 19 21:27   95 H   110/56   100


 


 19 21:22   95 H   86/59   100


 


 19 21:19   103 H   84/51  


 


 19 21:17   95 H     100


 


 19 21:13   95 H   140/57  


 


 19 21:12   94 H     100


 


 19 21:08   99 H   181/103  


 


 19 21:07   80     75 L


 


 19 21:03   94 H   89/53  


 


 19 21:02   91 H     100


 


 19 21:00   96 H     84


 


 19 20:57   110 H     99


 


 19 20:55   100 H   155/125  


 


 19 20:54   93 H     86


 


 19 20:52   108 H     100


 


 19 20:47   104 H     99


 


 19 20:42   99 H     100


 


 19 20:39   95 H   94/42   86


 


 19 20:37   104 H     100


 


 19 20:32   98 H     89


 


 19 20:28   90     86


 


 19 20:27   94 H     98


 


 19 20:23   98 H     86


 


 19 20:22   99 H     87


 


 19 20:17   86     76 L


 


 19 20:15   104 H     90


 


 19 20:12   80     65 L


 


 19 20:07  97.3 F L  100 H  18   111/56  100


 


 19 20:02   102 H     93


 


 19 20:01   53 L     83 L


 


 19 19:57   95 H     90


 


 19 19:54   105 H     81 L


 


 19 19:52   101 H     73 L


 


 19 19:49   107 H     77 L


 


 19 19:48   102 H     100


 


 19 19:43   104 H     93


 


 19 19:42   98 H   111/56   85


 


 19 19:39   96 H   115/57  


 


 19 19:38   86   115/58  


 


 19 19:37   233 H     71 L


 


 19 19:35   96 H   115/56  


 


 19 19:34   95 H   109/55  


 


 19 19:32   104 H     76 L


 


 19 19:31   48 L   101/60  


 


 19 19:29   100 H   123/59  


 


 19 19:28   98 H     99


 


 19 19:27   107 H   119/57  


 


 19 19:25   104 H   119/56  


 


 19 19:23   96 H   118/58   100


 


 19 19:22   97 H   116/54  


 


 19 19:19   106 H     91


 


 19 19:18   102 H   130/61   100


 


 19 19:13   108 H     93


 


 19 19:12   100 H     100


 


 19 19:07   100 H     100


 


 19 19:02   103 H     65 L


 


 19 19:01   101 H     98


 


 19 18:57   99 H     51 L


 


 19 18:56   101 H     98


 


 19 18:52   113 H     99


 


 19 18:47   94 H     100


 


 19 18:45   109 H     82 L


 


 19 18:42   112 H     96


 


 19 18:38   112 H     90


 


 19 18:37   94 H     100


 


 19 18:33   97 H     92


 


 19 18:32   85     100


 


 19 18:27   98 H     100


 


 19 18:26   104 H     70 L


 


 19 18:22   104 H     100


 


 19 18:18   110 H     88


 


 19 18:16   82     100


 


 19 18:12   95 H     100


 


 19 18:07   96 H     100


 


 19 18:02   99 H     100


 


 19 17:57   103 H     100


 


 19 17:52   94 H     100


 


 19 17:47   104 H     100


 


 19 17:42   115 H     100


 


 19 17:37   118 H     100


 


 19 17:32   104 H     100


 


 19 17:27   92 H     99


 


 19 17:22   94 H     100


 


 19 17:17   103 H     100


 


 19 17:13   112 H   123/59  


 


 19 17:12   101 H     99








                                Intake and Output











 19





 15:59 23:59 07:59


 


Intake Total  2.6 1201.667


 


Output Total  350 900


 


Balance  -347.4 301.667


 


Intake:   


 


  IV  2.6 841.667


 


    Lactated Ringers 1,000 ml   841.667





    @ 125 mls/hr IV AS   





    [facility] Rx#:[id number]   


 


    PITOCin/NS 30 UNIT/500ML  2.6 





    30 units In 500 ml @ 4   





    mls/hr IV TITR [facility] Rx#:   





    [id number]   


 


  Oral   360


 


Output:   


 


  Urine  350 900


 


    Indwelling Catheter  350 600


 


    Uretheral (Padron)   300


 


Other:   


 


  Total, Intake Amount   360


 


  Total, Output Amount  350 600


 


  Weight  86.183 kg 


 


  Estimated Blood Loss  800 














- Exam


Breasts: Present: normal


Cardiovascular: Present: Regular rate, Normal S1, Normal S2


Lungs: Present: Clear to auscultation


Abdomen: Present: normal appearance, soft


Vulva: both: normal


Uterus: Present: normal, firm


Extremities: Present: normal


Deep Tendon Reflex Grade: Normal +2


Incision: Present: dressed (clean, dry, intact)





- Labs


Labs: 


                              Abnormal lab results











  19 Range/Units





  17:05 


 


WBC  18.7 H  (4.5-11.0)  K/mm3

## 2019-04-17 NOTE — POST ANESTHESIA EVALUATION
- Post Anesthesia Evaluation


Patient Participated: Yes


Airway Patent: Yes


Stable Respiratory Function: Yes


Nausea/Vomiting: No


Temp > 96.8F: Yes


Pain Manageable: Yes


Adequeate Hydration: Yes


Anesthesia Complications: No


Block Receding Appropriately: Yes


Patient on Ventilator: No


Other Comments: Report to PACU RN. VSS. Explained to patient expected regression

of epidural block and pain control in PACU. OBGYN to handle pain management on 

floor.

## 2019-04-17 NOTE — OPERATIVE REPORT
Operative Report


Operative Report: 





Date:      2019





Preoperative diagnosis:   1.  Intrauterine pregnancy of 40 weeks


         2.  Failure to dilate





Postoperative diagnosis:   1.  Intrauterine pregnancy of 40 weeks


         2.  Failure to dilate





Procedure:      Low uterine transverse incision for  delivery





Surgeon:      Nessa Menon MD





Assistant:      Rosa Samuel CNM





Anesthesia:      Epidural





Anesthesiologist:      UNIQUE Stratton M.D.





Estimated blood loss:   800 mL





Urine out:      300 mL





Findings:      Live born male infant.  Weight 8 lbs. 6 oz.  Apgars 8 at 1 minute

and 9 at 5 minutes.  Uterus grossly normal, tubes grossly normal, ovaries 

grossly normal. 





Procedure:      After risk, benefits, complications, consequences and 

alternatives for this procedure were discussed with patient and consents were 

reviewed and signed, she was taken to the OR where epidural anesthesia was 

bolused.  She was then placed in the left lateral tilt position, and prepped and

draped in the usual sterile fashion.  Timeout was performed, and an appropriate 

level of anesthesia was noted, a Pfannenstiel incision was made and extended to 

the fascia which was incised and extended in the lateral directions.  The 

overlying fascia was sharply dissected away from the underlying rectus muscles 

in the superior and inferior directions.  The midline was entered bluntly.  The 

vesicouterine fold was incised and with blunt dissection the bladder flap was 

created.  A transverse incision was made in the lower uterine segment and 

extended in superiolateral direction with finger fractionation.  Clear fluid was

noted.  The infant was delivered from cephalic position.  Mouth and nose were 

bulb suctioned.  Spontaneous cry and excellent tone were noted.  Cord was doubly

clamped and cut.  The infant was given to /resuscitation team present.  

The placenta was manually extracted.  The uterus was then exteriorized and 

cleared of any further products of conception or placental tissue.  The incision

was reapproximated using 0 Vicryl in a running interlocking stitch.  The 

incision was reinforced with a suture 0 Vicryl in an imbricating fashion.  

Grossly normal uterus, tubes and ovaries were noted.  Once hemostasis was noted,

the uterus was allowed back into the pelvic cavity.  The pelvis was irrigated 

with warm normal saline.  Again hemostasis was noted .  Tisseel applied for 

further hemostasis.  Interceed was then placed to prevent adhesions.  Then 

attention was turned to the rectus muscles.  The rectus muscles reapproximated 

using 0 Vicryl in a simple interrupted stitch x 3. Once hemostasis was noted,  

the fascia was reapproximated using 0 Vicryl  running stitch fashion.  Once 

hemostasis was noted skin incision was reapproximated using 4-0 Vicryl on a 

Que needle in a subcuticular manner.





Counts were correct 3.  Patient tolerated procedure well state recovery room in

stable condition.

## 2019-04-18 VITALS — DIASTOLIC BLOOD PRESSURE: 56 MMHG | SYSTOLIC BLOOD PRESSURE: 108 MMHG

## 2019-04-18 RX ADMIN — HYDROCODONE BITARTRATE AND ACETAMINOPHEN PRN EACH: 5; 325 TABLET ORAL at 05:54

## 2019-04-18 RX ADMIN — IBUPROFEN PRN MG: 800 TABLET, FILM COATED ORAL at 03:39

## 2019-04-18 NOTE — DISCHARGE SUMMARY
Providers





- Providers


Date of Admission: 


19 16:31





Date of discharge: 19 (OOB ambulating in room; agrees with discharge)


Attending physician: 


KARLIE JENSEN





                                        





19 23:58


Consult to Lactation Consultant [CONS] Routine 


   Reason For Exam: 











Primary care physician: 


KARLIE JENSEN








Hospitalization


Reason for admission: active labor


Delivery: 


Procedure: primary low transverse (failure to progress)


Episiotomy: none


Laceration: none


Incision: normal, dry, intact


Other postpartum procedures: none


Postpartum complications: none


Discharge diagnosis: IUP at term delivered


[place] baby: male


Hospital course: 


uncomplicated  section





Pt w/o complaint VSS FF below umb Lochia scant Incision D&I H&H stable Doing 

well s/p  section P: discharge today with instructions RTO 1 week post 

op and circ Prescriptions given @ d/c





Condition at discharge: Good


Disposition: DC-01 TO HOME OR SELFCARE





- Discharge Diagnoses


(1)  delivery delivered


Status: Acute   Comment: RTO 1 week postop care   





Plan





- Discharge Medications


Prescriptions: 


Lidocain2.5%/Prilocai2.5% [Emla] 5 gm TP ONCE #1 tube


Ibuprofen [Motrin 800 MG tab] 800 mg PO TID PRN #30 tablet


 PRN Reason: Pain


oxyCODONE /ACETAMINOPHEN [Percocet 5/325 mg] 1 - 2 tab PO Q6HR PRN #20 tablet


 PRN Reason: Pain





- Provider Discharge Summary


Activity: routine, no sex for 6 weeks, no heavy lifting 4 weeks, no strenuous 

exercise


Diet: routine


Instructions: routine


Additional instructions: 


[]  Smoking cessation referral if applicable(refer to patient education folder 

for contact #)


[]  Refer to [facility] Booklet








Call your doctor immediately for:


* Fever > 100.5


* Heavy vaginal bleeding ( >1 pad per hour)


* Severe persistent headache


* Shortness of breath


* Reddened, hot, painful area to leg or breast


* Drainage or odor from incision.





* Keep incision clean and dry at all times and follow doctor's instructions 

regarding bathing/showering











- Follow up plan


Follow up: 


KARLIE JENSEN MD [Primary Care Provider] - 7 Days


(Congratulations!


Please call [phone] to schedule your postoperative visit and your son's cir

cumcision in 1 week.


Bring the EMLA cream with you to his visit. Do NOT use at home.


Take medications as prescribed.


Call with concerns.)

## 2021-03-18 LAB
HCT VFR BLD CALC: 40.2 % (ref 30.3–42.9)
HGB BLD-MCNC: 13 GM/DL (ref 10.1–14.3)
MCHC RBC AUTO-ENTMCNC: 32 % (ref 30–34)
MCV RBC AUTO: 82 FL (ref 79–97)
PLATELET # BLD: 278 K/MM3 (ref 140–440)
RBC # BLD AUTO: 4.89 M/MM3 (ref 3.65–5.03)

## 2021-03-22 NOTE — HISTORY AND PHYSICAL REPORT
History of Present Illness


Date of examination: 21


Chief complaint: 


Sterilization


History of present illness: 


Past Pregnancy History 


   :      3


   Term Births:   2


   Premature Births:   0


   Living Children:   2


   Para:      1


   Mult. Births:   0


   Prev :   1


   Prev.  attempt?   none


   Aborta:      1


   Elect. Ab:      1


   Spont. Ab:      0


   Ectopics:      0





Pregnancy # 1


   Delivery date:     2019


   Weeks Gestation:   40


   Delivery type:     


   Anesthesia type:     epidural


   Delivery location:     [facility]


   Infant Sex:      male


   Birth weight:   8.38


   Comments:      FTP





Pregnancy # 2


   Delivery date:     2021


   Weeks Gestation:   39


   Delivery type:     


   Anesthesia type:     spinal


   Delivery location:     [facility]


   Infant Sex:      male


   Birth weight:   6.88


   Comments:      Repeat C/S








GYN History 


Uterine Surgery (not C/S): negative


Operations: C -section  2019


 (2021)





Hospitalizations: negative


Anesthesia Complications: negative


Abnormal PAP: positive


Uterine Anomaly: negative


KATLYN Exposure: negative


Infertility: negative





Infection History 


HIV Risk Eval: no


TB exposure: no


Personal hx. of genital herpes: no


Partner hx. of genital herpes: no


Hx of STD: none





Active Medications (reviewed today):


ZOLOFT 50 MG ORAL TABLET (SERTRALINE HCL) 1 po in the morning





Current Allergies (reviewed today):


No known allergies








Past Medical History:


   Abnormal pap (2018) LGSIL


   Allergies-seasonal


   Depression





Past Surgical History:


   Reviewed history from 2021 and no changes required:


      C -section  2019


       (2021)





Family History Summary: 


   Reviewed history and no changes required: 2021


Mother - Has Family History of Diabetes - MGM  - Entered On: 2017





General Comments - FH:


mother  from unknown type of heart condition, 


Pt does not know any other family hx





Social History:


   Reviewed history from 2018 and no changes required:


      Patient is single


      bf for 4 years. 


      debut age 19. only on esex partner


      No ETOH/Drugs/Smoking


      Works for [employer]


      


      Smoking History:


      Patient has never smoked.








Risk Factors: 





Smoked Tobacco Use:  Never smoker


Smokeless Tobacco Use:  Never


Passive smoke exposure:  no


Drug use:  no


HIV high-risk behavior:  no


Alcohol use:  no


Exercise:  no


Seatbelt use:  100 %





Previous Tobacco Use: Signed On - 2021


Smoked Tobacco Use:  Former smoker


Smokeless Tobacco Use:  Never


   Counseled to quit/cut down:  yes


Passive smoke exposure:  no


Drug use:  no


HIV high-risk behavior:  low risk


Caffeine use:  0 drinks per day





Previous Alcohol Use: Signed On - 2021


Alcohol use:  no


Exercise:  no


Seatbelt use:  preg- %





Dietary Counseling: pn yes





Mammogram History:


   Date of Last Mammogram:  2011





PAP Smear History:


   Date of Last PAP Smear:  10/09/2019














Physical Exam 


Appearance: well developed, well nourished, no acute distress





Other Exams 


Heart: S1, S2, no murmur, rub, or gallop


Abdomen: soft, non-tender, no masses, bowel sounds normal





Genitourinary Exam 


Uterus: deferred for EUA











Impression & Recommendations:





Problem # 1:  Sterilization (ICD-V25.2) (UGT83-R78.2)


She desires bilateral salpingectomy at the time of sterilization for primary 

prevention of ovarian cancer. Explained to patient that salpingectomy offers the

opportunity to significantly decrease the risk of ovarian cancer, it does not 

eliminate the risk of ovarian cancer entirely. 


Possible laparoscopy or laparotomy explained to patient. The risks and 

alternatives for this surgery were reviewed with the patient. She was informed 

of possible bleeding, infection, injury to bowel, bladder, ureters or other 

adjacent organs. The patient was instructed/informed the following:


   The normal length of hospital stay for this procedure.


   Nothing to eat or drink after midnight the evening prior to surgery. 


   Clear liquids the evening prior to surgery 


Pre-op instruction sheets given. Wound care instructions given. Infection 

precautions reviewed, patient to call for any signs or symptoms of infection. 

The usual discomforts associated with this procedure were detailed. Proper use 

of pain medicines was reviewed. Risks of regret emphasized. Permanent and 

irreversible condition explained to patient.1%failure rate emphasized. Pt 

verbalized understanding. Patient was given ample opportunity to have all her 

questions answered before signing informed consent. Consent reviewed and signed.


 


 











Medications and Allergies


                                    Allergies











Allergy/AdvReac Type Severity Reaction Status Date / Time


 


No Known Allergies Allergy   Verified 19 16:56











                                Home Medications











 Medication  Instructions  Recorded  Confirmed  Last Taken  Type


 


No Known Home Medications [No  21 Unknown History





Reported Home Medications]     











Active Meds: 


Active Medications





Acetaminophen (Acetaminophen 500 Mg Tab)  1,000 mg PO PREOP ENID


Celecoxib (Celecoxib 200 Mg Cap)  200 mg PO PREOP NR


   Stop: 21 23:00


Gabapentin (Gabapentin 300 Mg Cap)  300 mg PO PREOP NR


   Stop: 21 23:00


Lactated Ringer's (Lactated Ringers)  1,000 mls @ 100 mls/hr IV AS DIRECT ENID


   Stop: 21 23:59


Midazolam HCl (Midazolam 2 Mg/2 Ml Inj)  2 mg IV PREOP NR


   Stop: 21 23:00











Exam


                                   Vital Signs











Temp Pulse Resp BP Pulse Ox


 


 99.2 F   90   18   114/57   99 


 


 21 11:45  21 11:45  21 11:45  21 11:45  21 11:45














Results





- Labs





                                 21 06:00








Assessment and Plan





- Patient Problems


(1) Sterilization


Status: Acute

## 2021-03-23 ENCOUNTER — HOSPITAL ENCOUNTER (OUTPATIENT)
Dept: HOSPITAL 5 - OR | Age: 26
Discharge: HOME | End: 2021-03-23
Attending: OBSTETRICS & GYNECOLOGY
Payer: COMMERCIAL

## 2021-03-23 VITALS — SYSTOLIC BLOOD PRESSURE: 124 MMHG | DIASTOLIC BLOOD PRESSURE: 62 MMHG

## 2021-03-23 DIAGNOSIS — Z98.891: ICD-10-CM

## 2021-03-23 DIAGNOSIS — Z83.3: ICD-10-CM

## 2021-03-23 DIAGNOSIS — Z30.2: Primary | ICD-10-CM

## 2021-03-23 DIAGNOSIS — Z79.899: ICD-10-CM

## 2021-03-23 DIAGNOSIS — Z82.49: ICD-10-CM

## 2021-03-23 DIAGNOSIS — Z20.822: ICD-10-CM

## 2021-03-23 PROCEDURE — 88302 TISSUE EXAM BY PATHOLOGIST: CPT

## 2021-03-23 PROCEDURE — 58661 LAPAROSCOPY REMOVE ADNEXA: CPT

## 2021-03-23 PROCEDURE — 85027 COMPLETE CBC AUTOMATED: CPT

## 2021-03-23 PROCEDURE — 36415 COLL VENOUS BLD VENIPUNCTURE: CPT

## 2021-03-23 PROCEDURE — 81025 URINE PREGNANCY TEST: CPT

## 2021-03-23 PROCEDURE — U0003 INFECTIOUS AGENT DETECTION BY NUCLEIC ACID (DNA OR RNA); SEVERE ACUTE RESPIRATORY SYNDROME CORONAVIRUS 2 (SARS-COV-2) (CORONAVIRUS DISEASE [COVID-19]), AMPLIFIED PROBE TECHNIQUE, MAKING USE OF HIGH THROUGHPUT TECHNOLOGIES AS DESCRIBED BY CMS-2020-01-R: HCPCS

## 2021-03-23 NOTE — ANESTHESIA CONSULTATION
Anesthesia Consult and Med Hx





- Airway


Anesthetic Teeth Evaluation: Good


ROM Head & Neck: Adequate


Mental/Hyoid Distance: Adequate


Mallampati Class: Class III


Intubation Access Assessment: Possibly Difficult





- Pre-Operative Health Status


ASA Pre-Surgery Classification: ASA1


Proposed Anesthetic Plan: General





- Pulmonary


Hx Smoking: No


Hx Respiratory Symptoms: No


Hx Sleep Apnea: No





- Cardiovascular System


Hx Hypertension: No





- Central Nervous System


CVA: No





- Gastrointestinal


Hx Gastroesophageal Reflux Disease: No





- Endocrine


Hx Renal Disease: No


Hx Liver Disease: No


Hx Insulin Dependent Diabetes: No


Hx Non-Insulin Dependent Diabetes: No


Hx Thyroid Disease: No





- Additional Comments


Anesthesia Medical History Comments: No hx anesthetic complications.

## 2021-03-23 NOTE — DISCHARGE SUMMARY
Providers





- Providers


Attending physician: 


KARLIE JENSEN





Primary care physician: 


PRIMARY CARE MD








Hospitalization


Condition: Good


Hospital course: 





Unremarkable


Disposition: DC-01 TO HOME OR SELFCARE


Final Discharge Diagnosis (Prints w/discharge instructions): Bilateral 

laparoscopic salpingectomy for sterilization lysis of adhesion





- Discharge Diagnoses


(1) Sterilization


Status: Acute   





Core Measure Documentation





- Palliative Care


Palliative Care/ Comfort Measures: Not Applicable





- Core Measures


Any of the following diagnoses?: none





Exam





- Constitutional


Vitals: 


                                        











Temp Pulse Resp BP Pulse Ox


 


 99.1 F   70   16   111/79   99 


 


 03/23/21 09:45  03/23/21 09:45  03/23/21 09:45  03/23/21 09:45  03/23/21 09:45











General appearance: Present: no acute distress





- Respiratory


Respiratory effort: normal





- Cardiovascular


Rhythm: regular





Plan


Activity: other (No sex.  No exercise.  No driving for 48 hours.  Ambulate 

approximately 1 mile on her property daily.  Cough and deep breathe every hour 

while awake.  Void every hour while awake.)


Weight Bearing Status: Full Weight Bearing


Diet: regular (Eat small meals frequently.  Drink approximately 70 ounces of 

water a day.  Avoid spicy, fatty, and salty foods.)


Special Instructions: no heavy lifting (Greater than 25 pounds)


Follow up with: 


PRIMARY CARE,MD [Primary Care Provider] - 7 Days


KARLIE JENSEN MD [Staff Physician] -  (As scheduled)


Prescriptions: 


RX: Ibuprofen [Motrin 800 MG tab] 800 mg PO Q8HR PRN #30 tablet


 PRN Reason: pain


RX: oxyCODONE /ACETAMINOPHEN [Percocet 5/325 mg] 1 tab PO Q6HR PRN #5 tablet


 PRN Reason: Pain

## 2021-03-23 NOTE — OPERATIVE REPORT
Operative Report


Operative Report: 


Date of operation:            3/23/2021





Pre-operative diagnosis:      1.  Desires sterilization by bilateral salpi

ngectomy








Post-operative diagnosis:    1.  Desires sterilization by bilateral 

salpingectomy


         2.  Pelvic adhesions








Procedure name(s):            1.  Laparoscopic bilateral salpingectomy for 

sterilization


         2.  Lysis of adhesion


 


Surgeon:       Nessa Menon MD





Assistant:       []





Anesthesia:       General endotracheal anesthesia





Anesthesiologist:   Dr. Hickey





EBL:       Minimal





Urine output:       300 mL of clear yellow urine 





Findings:                                       Grossly normal uterus tubes and 

ovaries.  Omentum was adhered to the anterior abdominal wall.  Dense adhesion of

 the uterus to the anterior abdominal wall.








Procedure:      After risks, benefits, complications, consequences and 

alternatives for this procedure were discussed with patient and she voiced 

understanding desire to proceed, the patient was taken to the operating suite 

and placed in the supine position.  General anesthesia was induced.  She placed 

in dorsolithotomy position and prepped and draped in the usual sterile fashion. 

 Timeout was performed.  The bladder was drained of approximately 300 cc of 

clear yellow urine.  An operative speculum was introduced into the vagina, and 

the anterior lip of the cervix was grasped with an Allis clamp.  The uterus was 

sounded to 8 cm.  The cervix was progressively dilated to allow the Sargis 

uterine manipulator to be placed without difficulty.  The speculum and clamp 

were removed.  Sterile gloves were placed and attention was turned to the 

abdomen.  





An supraumbilical incision was made and a 5 mm bladeless Optiview trocar was 

placed with laparoscope and camera inserted.  No obvious bowel, bladder, 

ureteral or major vascular injury was noted.  The abdomen was insufflated.  She 

was then placed in Trendelenburg position.  Grossly normal uterus tubes and 

ovaries were noted.  Due to the dense adhesion of the uterus to the anterior 

abdominal wall the suprapubic region could not be easily accessed without injury

 to the uterus therefore the decision was made to place a trocar in the right 

midclavicular lower abdominal region.  An incision was made in the right lower 

midclavicular region and a 5 mm trocar was placed through the incision under 

direct visualization.    Again no obvious bowel, bladder, ureteral or major 

vascular injury was noted.  The uterus was elevated, and using the 5 mm [place]

 LigaSure device bilateral salpingectomy was performed in the usual fashion.  

Each tube was removed through the 5 mm trocar.  Attention was turned back to the

 adnexa when hemostasis was noted bilaterally.  The CO2 was released under 

direct visualization to ensure hemostasis.  Hemostasis was noted.  At which 

point the procedure was ended.  The 5 mm trochar removed under direct 

visualization hemostasis was noted.  The CO2 was then released.  The midline 

supraumbilical 5 mm trocar was removed.  The incisions were infused with half 

percent Marcaine without epinephrine.  The incisions were reapproximated using 

4-0 Monocryl in a subcuticular manner.  Hemostasis was noted.





Attention was turned to the vagina where the Sargis uterine manipulator was 

removed.  Hemostasis was noted.





Patient was taken to recovery room in stable condition.